# Patient Record
(demographics unavailable — no encounter records)

---

## 2017-10-09 NOTE — RAD
Exam performed: OB sonogram second trimester.



History: Pelvic pain.



Date of service: 10/09/17. Comparison: None available



Technique: Transabdominal.



Findings:



Single live intrauterine fetus is noted in breech presentation. The fetal

maturity is as follows.



BPD 2.57 cm corresponding to 14 weeks 3 days

Head circumference 9.54 cm corresponding to 14 weeks 3 days

Abdominal circumference 8.6 calcium corresponding to 14 weeks 6 days

Femur length 1.62 cm corresponding to 40 weeks 5 days

Head circumference to abdominal circumference is 1.11, however note is made

that head measurements were difficult to obtain 

Sonographic EDC 04/05/18.



Fetal heart rate is 1 47 bpm.



Bilateral ovaries appear normal demonstrating symmetric vascularity. The right

ovary measures 1.4 x 0.7 x 2.3 cm the left ovary measures 3.0 x 2.1 x 2.7 cm.

Cervix is not well seen



Impression:



Single intrauterine gestational sac containing a live fetus of maturity 20

weeks and 4 days. Detailed anatomical survey may be of obtained at

approximately 20 weeks gestation.

## 2017-10-09 NOTE — PHYS DOC
Adult General


Chief Complaint


Chief Complaint:  ABDOMINAL PAIN IN PREGNANCY





HPI


HPI





Patient is a 22  year old female presenting to the emergency department for 

evaluation of left-sided abdominal pain that has been going on for 

approximately 2 days.  She is [place]-speaking only and was interviewed with 

[place] language line.  She is  approximately 14 weeks pregnant but does 

not have an OB with this pregnancy.  She denies any fevers chills vaginal 

bleeding vaginal discharge dysuria hematuria constipation diarrhea or prior 

abdominal surgeries.  She says that she has L ovarian pain and she has had 

cysts there in past.  She is in no obvious distress with normal vital signs.





Review of Systems


Review of Systems





Constitutional: Denies fever or chills []


Eyes: Denies change in visual acuity, redness, or eye pain []


Respiratory: Denies cough or shortness of breath []


Cardiovascular: No additional information not addressed in HPI []


GI: + abdominal pain, nausea, vomiting.  No bloody stools or diarrhea []


: Denies dysuria or hematuria []


Musculoskeletal: Denies back pain or joint pain []


Integument: Denies rash or skin lesions []


Neurologic: Denies headache, focal weakness or sensory changes []





Current Medications


Current Medications





Current Medications








 Medications


  (Trade)  Dose


 Ordered  Sig/[facility]  Start Time


 Stop Time Status Last Admin


Dose Admin


 


 Fentanyl Citrate


  (Fentanyl 2ml


 Vial)  50 mcg  1X  ONCE  10/9/17 10:00


 10/9/17 10:01 DC 10/9/17 10:22


50 MCG


 


 Ondansetron HCl


  (Zofran)  8 mg  1X  ONCE  10/9/17 10:00


 10/9/17 10:01 DC 10/9/17 10:21


8 MG


 


 Sodium Chloride  1,000 ml @ 


 1,000 mls/hr  1X  ONCE  10/9/17 10:00


 10/9/17 10:59 DC 10/9/17 10:21


1,000 MLS/HR











Allergies


Allergies





Allergies








Coded Allergies Type Severity Reaction Last Updated Verified


 


  No Known Drug Allergies    10/9/17 No











Physical Exam


Physical Exam





Constitutional: Well developed, well nourished, no acute distress, non-toxic 

appearance. []


HENT: Normocephalic, atraumatic, bilateral external ears normal, oropharynx 

moist, no oral exudates, nose normal. []


Eyes: PERRLA, EOMI, conjunctiva normal, no discharge. [] 


Neck: Normal range of motion, no tenderness, supple, no stridor. [] 


Cardiovascular:Heart rate regular rhythm, no murmur []


Lungs & Thorax:  Bilateral breath sounds clear to auscultation []


Abdomen: Bowel sounds normal, soft, + LUQ AND LLQ tenderness, no rebound or 

guarding, no masses, no pulsatile masses. [] 


Skin: Warm, dry, no erythema, no rash. [] 


Back: No tenderness, no CVA tenderness. [] 


Extremities: No tenderness, no cyanosis, no clubbing, ROM intact, no edema. [] 


Neurologic: Alert and oriented X 3, normal motor function, normal sensory 

function, no focal deficits noted. []





Current Patient Data


Vital Signs





 Vital Signs








  Date Time  Temp Pulse Resp B/P (MAP) Pulse Ox O2 Delivery O2 Flow Rate FiO2


 


10/9/17 10:38  60 18 93/76 (82) 99 Room Air  


 


10/9/17 08:40 97.8       





 97.8       








Lab Values





 Laboratory Tests








Test


  10/9/17


09:15 10/9/17


11:15


 


White Blood Count


  10.2 x10^3/uL


(4.0-11.0) 


 


 


Red Blood Count


  3.87 x10^6/uL


(3.50-5.40) 


 


 


Hemoglobin


  12.5 g/dL


(12.0-15.5) 


 


 


Hematocrit


  36.5 %


(36.0-47.0) 


 


 


Mean Corpuscular Volume


  94 fL ()


  


 


 


Mean Corpuscular Hemoglobin 32 pg (25-35)   


 


Mean Corpuscular Hemoglobin


Concent 34 g/dL


(31-37) 


 


 


Red Cell Distribution Width


  12.3 %


(11.5-14.5) 


 


 


Platelet Count


  243 x10^3/uL


(140-400) 


 


 


Neutrophils (%) (Auto) 71 % (31-73)   


 


Lymphocytes (%) (Auto) 17 % (24-48)  L 


 


Monocytes (%) (Auto) 7 % (0-9)   


 


Eosinophils (%) (Auto) 5 % (0-3)  H 


 


Basophils (%) (Auto) 0 % (0-3)   


 


Neutrophils # (Auto)


  7.3 x10^3uL


(1.8-7.7) 


 


 


Lymphocytes # (Auto)


  1.8 x10^3/uL


(1.0-4.8) 


 


 


Monocytes # (Auto)


  0.7 x10^3/uL


(0.0-1.1) 


 


 


Eosinophils # (Auto)


  0.5 x10^3/uL


(0.0-0.7) 


 


 


Basophils # (Auto)


  0.0 x10^3/uL


(0.0-0.2) 


 


 


Sodium Level


  140 mmol/L


(136-145) 


 


 


Potassium Level


  3.8 mmol/L


(3.5-5.1) 


 


 


Chloride Level


  105 mmol/L


() 


 


 


Carbon Dioxide Level


  26 mmol/L


(21-32) 


 


 


Anion Gap 9 (6-14)   


 


Blood Urea Nitrogen


  10 mg/dL


(7-20) 


 


 


Creatinine


  0.5 mg/dL


(0.6-1.0)  L 


 


 


Estimated GFR


(Cockcroft-Gault) 154.3  


  


 


 


BUN/Creatinine Ratio 20 (6-20)   


 


Glucose Level


  85 mg/dL


(70-99) 


 


 


Calcium Level


  8.8 mg/dL


(8.5-10.1) 


 


 


Magnesium Level


  1.8 mg/dL


(1.8-2.4) 


 


 


Total Bilirubin


  0.2 mg/dL


(0.2-1.0) 


 


 


Aspartate Amino Transferase


(AST) 17 U/L (15-37)


  


 


 


Alanine Aminotransferase (ALT)


  22 U/L (14-59)


  


 


 


Alkaline Phosphatase


  87 U/L


() 


 


 


Total Protein


  6.8 g/dL


(6.4-8.2) 


 


 


Albumin


  3.2 g/dL


(3.4-5.0)  L 


 


 


Albumin/Globulin Ratio


  0.9 (1.0-1.7)


L 


 


 


Lipase


  180 U/L


() 


 


 


Urine Collection Type  Unknown  


 


Urine Color  Yellow  


 


Urine Clarity  Clear  


 


Urine pH  6.5  


 


Urine Specific Gravity  1.020  


 


Urine Protein


  


  Negative mg/dL


(NEG-TRACE)


 


Urine Glucose (UA)


  


  Negative mg/dL


(NEG)


 


Urine Ketones (Stick)


  


  40 mg/dL (NEG)


 


 


Urine Blood


  


  Negative (NEG)


 


 


Urine Nitrite


  


  Negative (NEG)


 


 


Urine Bilirubin


  


  Negative (NEG)


 


 


Urine Urobilinogen Dipstick


  


  0.2 mg/dL (0.2


mg/dL)


 


Urine Leukocyte Esterase


  


  Negative (NEG)


 


 


Urine RBC  0 /HPF (0-2)  


 


Urine WBC  0 /HPF (0-4)  


 


Urine Squamous Epithelial


Cells 


  Few /LPF  


 


 


Urine Bacteria


  


  0 /HPF (0-FEW)


 


 


Urine Mucus  Marked /LPF  





 Laboratory Tests


10/9/17 09:15








 Laboratory Tests


10/9/17 09:15











EKG


EKG


[]





Radiology/Procedures


Radiology/Procedures


Exam performed: OB sonogram second trimester.





History: Pelvic pain.





Date of service: 10/09/17. Comparison: None available





Technique: Transabdominal.





Findings:





Single live intrauterine fetus is noted in breech presentation. The fetal


maturity is as follows.





BPD 2.57 cm corresponding to 14 weeks 3 days


Head circumference 9.54 cm corresponding to 14 weeks 3 days


Abdominal circumference 8.6 calcium corresponding to 14 weeks 6 days


Femur length 1.62 cm corresponding to 40 weeks 5 days


Head circumference to abdominal circumference is 1.11, however note is made


that head measurements were difficult to obtain 


Sonographic EDC 18.





Fetal heart rate is 1 47 bpm.





Bilateral ovaries appear normal demonstrating symmetric vascularity. The right


ovary measures 1.4 x 0.7 x 2.3 cm the left ovary measures 3.0 x 2.1 x 2.7 cm.


Cervix is not well seen





Impression:





Single intrauterine gestational sac containing a live fetus of maturity 20


weeks and 4 days. Detailed anatomical survey may be of obtained at


approximately 20 weeks gestation.




















DICTATED and SIGNED BY:     DOMINIC ZULETA MD


DATE:     10/09/17 1102





Course & Med Decision Making


Course & Med Decision Making


Given language barrier I will check labs urine ultrasound to evaluate for 

torsion and reassess.





Patient with normal ultrasound and her labs and urinalysis are normal as well.  

Her pain and nausea are gone and she has a repeat normal abdominal exam.  I 

suspect this is either constipation or other functional abdominal pain.  

Patient will be discharged in stable condition with instructions to follow with 

OB in the next 2-3 days and come back to the ED sooner with worsening pain 

fevers vomiting or other general concerns.  Patient aware and agreeable with 

plan for discharge and verbalized understanding of the need for short-term 

follow-up in the strict ED return precautions discussed as above.





Dragon Disclaimer


Dragon Disclaimer


This electronic medical record was generated, in whole or in part, using a 

voice recognition dictation system.





Departure


Departure


Impression:  


 Primary Impression:  


 Abdominal pain affecting pregnancy


Disposition:   HOME, SELF-CARE


Condition:  STABLE


Referrals:  


SANTOS MIDDLETON Jr, MD


Patient Instructions:  Abdominal Pain During Pregnancy


Scripts


Ondansetron (ZOFRAN ODT) 4 Mg Tab.rapdis


4 MG PO BID Y for NAUSEA/VOMITING, #10 TAB


   Prov: CORETTA OLSON DO         10/9/17











CORETTA OLSON DO Oct 9, 2017 08:18